# Patient Record
Sex: FEMALE | Race: WHITE | Employment: FULL TIME | URBAN - METROPOLITAN AREA
[De-identification: names, ages, dates, MRNs, and addresses within clinical notes are randomized per-mention and may not be internally consistent; named-entity substitution may affect disease eponyms.]

---

## 2017-03-20 PROBLEM — M54.2 CHRONIC NECK PAIN: Status: ACTIVE | Noted: 2017-03-20

## 2017-03-20 PROBLEM — G89.29 CHRONIC NECK PAIN: Status: ACTIVE | Noted: 2017-03-20

## 2018-03-22 PROCEDURE — 88175 CYTOPATH C/V AUTO FLUID REDO: CPT | Performed by: FAMILY MEDICINE

## 2018-03-22 PROCEDURE — 87624 HPV HI-RISK TYP POOLED RSLT: CPT | Performed by: FAMILY MEDICINE

## 2020-01-30 PROBLEM — E55.9 VITAMIN D DEFICIENCY: Status: ACTIVE | Noted: 2020-01-30

## 2021-05-04 NOTE — PROGRESS NOTES
HPI/Subjective:   Patient ID: Harriett Esqueda is a 63year old female. HPI   63yr old female presents as a new patient with c/o depression/anxiety, migraines, perimenopause and obesity.   C/o indifferent mood, irritability, anxiety, sleep disturbances Misc Natural Products (JOINT SUPPORT COMPLEX OR) Take by mouth.      • escitalopram (LEXAPRO) 10 MG Oral Tab Take 5mg po daily x 1wk and then 10mg po daily 30 tablet 1   • Rizatriptan Benzoate 10 MG Oral Tab Take 1 tablet (10 mg total) by mouth daily as nee encounter.       Meds This Visit:  Requested Prescriptions     Signed Prescriptions Disp Refills   • escitalopram (LEXAPRO) 10 MG Oral Tab 30 tablet 1     Sig: Take 5mg po daily x 1wk and then 10mg po daily       Imaging & Referrals:  None

## 2021-05-04 NOTE — PATIENT INSTRUCTIONS
Your Body’s Response to Anxiety  Normal anxiety is part of the body’s natural defense system.  It's an alert to a threat that is unknown, vague, or comes from your own internal fears. While you’re in this state, your feelings can range from a vague sense to anxiety problems. Anxiety can be treated  The good news is that the anxiety that’s disrupting your life can be treated. Check with your healthcare provider and rule out any physical problems that may be causing the anxiety symptoms.  If an anxiety diso depression, you can also help yourself. Keep in mind that your illness affects you emotionally, physically, mentally, and socially. So full recovery will take time. Take care of your body and your soul, and be patient with yourself as you get better.   Self when it’s offered. Take care of your body  People with depression often lose the desire to take care of themselves. That only makes their problems worse. During treatment and afterward, make a point to:  · Exercise.  It’s a great way to take care of your

## 2021-06-01 ENCOUNTER — TELEPHONE (OUTPATIENT)
Dept: FAMILY MEDICINE CLINIC | Facility: CLINIC | Age: 56
End: 2021-06-01

## 2021-06-01 DIAGNOSIS — Z12.31 BREAST CANCER SCREENING BY MAMMOGRAM: Primary | ICD-10-CM

## 2021-06-01 NOTE — TELEPHONE ENCOUNTER
Pt calling asking if doctor will place mammogram orders so she can have them done before her appointment. Please advise.     Future Appointments   Date Time Provider [place]   6/15/2021  4:40 PM Felicia Garduno,  [facility]

## 2021-06-01 NOTE — TELEPHONE ENCOUNTER
3/5/2020:  RIGHT BREAST DIAGNOSTIC MAMMOGRAM ADDITIONAL VIEWS  WITH TOMOSYNTHESIS  AND RIGHT BREAST ULTRASOUND    IMPRESSION AND RECOMMENDATION  Benign findings. No mammographic or sonographic evidence of malignancy. Right breast cysts.  In the  absence of

## 2021-06-05 ENCOUNTER — HOSPITAL ENCOUNTER (OUTPATIENT)
Dept: MAMMOGRAPHY | Age: 56
Discharge: HOME | End: 2021-06-05
Attending: FAMILY MEDICINE
Payer: COMMERCIAL

## 2021-06-05 DIAGNOSIS — Z12.31 BREAST CANCER SCREENING BY MAMMOGRAM: ICD-10-CM

## 2021-06-05 PROCEDURE — 77063 BREAST TOMOSYNTHESIS BI: CPT | Performed by: FAMILY MEDICINE

## 2021-06-05 PROCEDURE — 77067 SCR MAMMO BI INCL CAD: CPT | Performed by: FAMILY MEDICINE

## 2021-06-15 ENCOUNTER — OFFICE VISIT (OUTPATIENT)
Dept: FAMILY MEDICINE CLINIC | Facility: CLINIC | Age: 56
End: 2021-06-15
Payer: COMMERCIAL

## 2021-06-15 VITALS
SYSTOLIC BLOOD PRESSURE: 122 MMHG | BODY MASS INDEX: 31.43 KG/M2 | DIASTOLIC BLOOD PRESSURE: 82 MMHG | HEART RATE: 75 BPM | WEIGHT: 207.38 LBS | TEMPERATURE: 97 F | HEIGHT: 68.11 IN | RESPIRATION RATE: 16 BRPM | OXYGEN SATURATION: 98 %

## 2021-06-15 DIAGNOSIS — F41.8 DEPRESSION WITH ANXIETY: ICD-10-CM

## 2021-06-15 DIAGNOSIS — Z00.00 ROUTINE GENERAL MEDICAL EXAMINATION AT A HEALTH CARE FACILITY: Primary | ICD-10-CM

## 2021-06-15 DIAGNOSIS — Z23 NEED FOR VACCINATION: ICD-10-CM

## 2021-06-15 DIAGNOSIS — Z00.00 LABORATORY EXAM ORDERED AS PART OF ROUTINE GENERAL MEDICAL EXAMINATION: ICD-10-CM

## 2021-06-15 DIAGNOSIS — D22.9 CHANGE IN MOLE: ICD-10-CM

## 2021-06-15 DIAGNOSIS — E55.9 VITAMIN D DEFICIENCY: ICD-10-CM

## 2021-06-15 PROCEDURE — 99396 PREV VISIT EST AGE 40-64: CPT | Performed by: FAMILY MEDICINE

## 2021-06-15 PROCEDURE — 3074F SYST BP LT 130 MM HG: CPT | Performed by: FAMILY MEDICINE

## 2021-06-15 PROCEDURE — 3008F BODY MASS INDEX DOCD: CPT | Performed by: FAMILY MEDICINE

## 2021-06-15 PROCEDURE — 99213 OFFICE O/P EST LOW 20 MIN: CPT | Performed by: FAMILY MEDICINE

## 2021-06-15 PROCEDURE — 3079F DIAST BP 80-89 MM HG: CPT | Performed by: FAMILY MEDICINE

## 2021-06-15 NOTE — PATIENT INSTRUCTIONS
Prevention Guidelines, Women Ages 48 to 59  Screening tests and vaccines are an important part of managing your health. A screening test is done to find possible disorders or diseases in people who don't have any symptoms.  The goal is to find a disease e have had a complete hysterectomy  Pap test every 3 years or Pap test with human papillomavirus (HPV) test every 5 years    Chlamydia Women who are sexually active and at increased risk for infection  At yearly routine exams    Colorectal cancer All women a with your healthcare provider for more information.     Obesity All women in this age group  At yearly routine exams    Osteoporosis Women who are postmenopausal  Talk with your healthcare provider    Syphilis Women at increased risk for infection  At [facility] Tetanus/diphtheria/pertussis (Td/Tdap) booster All women in this age group  Td every 10 years, or a 1-time dose of Tdap instead of a Td booster after age 25, then Td every 10 years    Recombinant zoster vaccine (RZV)  All women ages 48 and older  If 2 dose watch the moles on your body and tell your healthcare provider about any that concern you. What are moles? Moles are a type of pigmented janelle.  Freckles are another type of pigmented janelle that are often sprinkled across the bridge of the nose, the cheeks, help you see if any moles change over time. When to get medical care  See your healthcare provider if your moles hurt, itch, ooze, bleed, thicken, become crusty, or show other changes.  Also call your health care provider if your moles show any of these si

## 2021-06-15 NOTE — PROGRESS NOTES
Patient presents with:  Physical      HPI:  63yr old female presents for her annual physical and f/u on depression w/ anxiety. Depression with anxiety, taking lexapro 10mg po daily. Denies any significant SEs of mediation.  Mood has improved, less irritab ?????     Social History    Tobacco Use      Smoking status: Never Smoker      Smokeless tobacco: Never Used    Vaping Use      Vaping Use: Never used    Alcohol use:  Yes      Alcohol/week: 0.0 standard drinks      Comment: social     Drug use: No      Cur Dexa Scan: n/a  5.  Immunizations:   Immunization History  Administered            Date(s) Administered    [employer] 30 mcg/0.3 ml                          04/06/2021 04/27/2021      TDAP                  11/16/2010 01/30/2020    Deferred needed    Orders Placed This Encounter      CBC With Diff      CMP      Lipid      TSH W Reflex To Free T4      Vitamin D, 25-Hydroxy      Meds & Refills for this Visit:  Requested Prescriptions      No prescriptions requested or ordered in this encounter

## 2021-06-26 ENCOUNTER — LAB ENCOUNTER (OUTPATIENT)
Dept: LAB | Age: 56
End: 2021-06-26
Attending: FAMILY MEDICINE
Payer: COMMERCIAL

## 2021-06-26 DIAGNOSIS — Z00.00 LABORATORY EXAM ORDERED AS PART OF ROUTINE GENERAL MEDICAL EXAMINATION: ICD-10-CM

## 2021-06-26 DIAGNOSIS — E55.9 VITAMIN D DEFICIENCY: ICD-10-CM

## 2021-06-26 PROCEDURE — 82306 VITAMIN D 25 HYDROXY: CPT | Performed by: FAMILY MEDICINE

## 2021-06-26 PROCEDURE — 80061 LIPID PANEL: CPT | Performed by: FAMILY MEDICINE

## 2021-06-26 PROCEDURE — 80050 GENERAL HEALTH PANEL: CPT | Performed by: FAMILY MEDICINE

## 2021-07-01 DIAGNOSIS — F41.8 DEPRESSION WITH ANXIETY: ICD-10-CM

## 2021-07-02 RX ORDER — ESCITALOPRAM OXALATE 10 MG/1
TABLET ORAL
Qty: 30 TABLET | Refills: 0 | Status: SHIPPED | OUTPATIENT
Start: 2021-07-02 | End: 2021-08-03

## 2021-07-02 NOTE — TELEPHONE ENCOUNTER
LOV 6/15/2021    LAST LAB    LAST RX 5-4--21 30*1    Next OV No future appointments.     PROTOCOL  Name from pharmacy: Escitalopram Oxalate 10 Mg Tab Solc          Will file in chart as: ESCITALOPRAM 10 MG Oral Tab    Sig: take one-half of a tablet by mouth

## 2021-07-15 ENCOUNTER — TELEMEDICINE (OUTPATIENT)
Dept: FAMILY MEDICINE CLINIC | Facility: CLINIC | Age: 56
End: 2021-07-15
Payer: COMMERCIAL

## 2021-07-15 DIAGNOSIS — F41.8 DEPRESSION WITH ANXIETY: ICD-10-CM

## 2021-07-15 DIAGNOSIS — E55.9 VITAMIN D INSUFFICIENCY: ICD-10-CM

## 2021-07-15 DIAGNOSIS — D64.9 ANEMIA, UNSPECIFIED TYPE: Primary | ICD-10-CM

## 2021-07-15 PROCEDURE — 99213 OFFICE O/P EST LOW 20 MIN: CPT | Performed by: FAMILY MEDICINE

## 2021-07-15 NOTE — PROGRESS NOTES
Audio visit  Please note that the following visit was completed using two-way, real-time interactive audio communication.   This has been done in good blaire to provide continuity of care in the best interest of the provider-patient relationship, due to the Pulmonary effort is normal.   Neurological:      Mental Status: She is oriented to person, place, and time. Psychiatric:         Mood and Affect: Mood normal.         Behavior: Behavior normal.         Assessment & Plan:   1.  Anemia, unspecified type  -

## 2021-07-28 ENCOUNTER — LAB ENCOUNTER (OUTPATIENT)
Dept: LAB | Age: 56
End: 2021-07-28
Attending: FAMILY MEDICINE
Payer: COMMERCIAL

## 2021-07-28 DIAGNOSIS — D64.9 ANEMIA, UNSPECIFIED TYPE: ICD-10-CM

## 2021-07-28 LAB
DEPRECATED HBV CORE AB SER IA-ACNC: 6.6 NG/ML
IRON SATURATION: 6 %
IRON SERPL-MCNC: 27 UG/DL
TOTAL IRON BINDING CAPACITY: 459 UG/DL (ref 240–450)
TRANSFERRIN SERPL-MCNC: 308 MG/DL (ref 200–360)
VIT B12 SERPL-MCNC: 392 PG/ML (ref 193–986)

## 2021-07-28 PROCEDURE — 83550 IRON BINDING TEST: CPT | Performed by: FAMILY MEDICINE

## 2021-07-28 PROCEDURE — 83540 ASSAY OF IRON: CPT | Performed by: FAMILY MEDICINE

## 2021-07-28 PROCEDURE — 82607 VITAMIN B-12: CPT | Performed by: FAMILY MEDICINE

## 2021-07-28 PROCEDURE — 82728 ASSAY OF FERRITIN: CPT | Performed by: FAMILY MEDICINE

## 2021-08-01 DIAGNOSIS — F41.8 DEPRESSION WITH ANXIETY: ICD-10-CM

## 2021-08-03 RX ORDER — ESCITALOPRAM OXALATE 10 MG/1
10 TABLET ORAL DAILY
Qty: 30 TABLET | Refills: 1 | Status: SHIPPED | OUTPATIENT
Start: 2021-08-03 | End: 2021-08-16

## 2021-08-16 ENCOUNTER — TELEPHONE (OUTPATIENT)
Dept: FAMILY MEDICINE CLINIC | Facility: CLINIC | Age: 56
End: 2021-08-16

## 2021-08-16 DIAGNOSIS — F41.8 DEPRESSION WITH ANXIETY: ICD-10-CM

## 2021-08-16 RX ORDER — ESCITALOPRAM OXALATE 10 MG/1
10 TABLET ORAL DAILY
Qty: 90 TABLET | Refills: 0 | Status: SHIPPED | OUTPATIENT
Start: 2021-08-16 | End: 2022-01-27

## 2021-08-16 NOTE — TELEPHONE ENCOUNTER
Per tele visit notes 7/15/21: Depression with anxiety  - stable  - cpm  She understands and agrees with tx plan  RTC in 3mo, sooner if needed    Last refill   escitalopram 10 MG Oral Tab 30 tablet 1 8/3/2021    Sig:   Take 1 tablet (10 mg total) by mouth d

## 2021-08-16 NOTE — TELEPHONE ENCOUNTER
Patient asking how to request 90 day supply of Lexapro. If Doctor would authorize 90 days at a time.     Please call patient

## 2021-08-16 NOTE — TELEPHONE ENCOUNTER
Called pt to inform per PCP ok for 90 day supply of Lexapro- refilled to [address] listed as requested. Reminder to still f/u in 3 months (10/2021). Nothing further needed at this time. No further questions or concerns.  Pt verbalized understanding and

## 2022-01-24 ENCOUNTER — TELEPHONE (OUTPATIENT)
Dept: FAMILY MEDICINE CLINIC | Facility: CLINIC | Age: 57
End: 2022-01-24

## 2022-01-24 NOTE — TELEPHONE ENCOUNTER
Pt requesting refill on escitalopram 10 MG Oral Tab       Sent pt [employer])hart message to schedule f/u appt that she is overdue for.

## 2022-01-26 DIAGNOSIS — F41.8 DEPRESSION WITH ANXIETY: ICD-10-CM

## 2022-01-27 RX ORDER — ESCITALOPRAM OXALATE 10 MG/1
TABLET ORAL
Qty: 90 TABLET | Refills: 0 | Status: SHIPPED | OUTPATIENT
Start: 2022-01-27

## 2022-01-27 NOTE — TELEPHONE ENCOUNTER
LOV 6/15/2021    LAST LAB    LAST RX 8-16-21 90*0    Next OV No future appointments.     PROTOCOL  Name from pharmacy: Escitalopram Oxalate 10 Mg Tab Auro          Will file in chart as: ESCITALOPRAM 10 MG Oral Tab    Sig: TAKE ONE TABLET BY MOUTH ONE TIME

## 2022-06-10 ENCOUNTER — TELEPHONE (OUTPATIENT)
Dept: FAMILY MEDICINE CLINIC | Facility: CLINIC | Age: 57
End: 2022-06-10

## 2022-06-10 DIAGNOSIS — Z12.31 ENCOUNTER FOR SCREENING MAMMOGRAM FOR MALIGNANT NEOPLASM OF BREAST: Primary | ICD-10-CM

## 2022-06-24 ENCOUNTER — HOSPITAL ENCOUNTER (OUTPATIENT)
Dept: MAMMOGRAPHY | Age: 57
Discharge: HOME | End: 2022-06-24
Attending: FAMILY MEDICINE
Payer: COMMERCIAL

## 2022-06-24 DIAGNOSIS — Z12.31 ENCOUNTER FOR SCREENING MAMMOGRAM FOR MALIGNANT NEOPLASM OF BREAST: ICD-10-CM

## 2022-06-24 PROCEDURE — 77067 SCR MAMMO BI INCL CAD: CPT | Performed by: FAMILY MEDICINE

## 2022-06-24 PROCEDURE — 77063 BREAST TOMOSYNTHESIS BI: CPT | Performed by: FAMILY MEDICINE

## 2022-09-20 ENCOUNTER — MED REC SCAN ONLY (OUTPATIENT)
Dept: FAMILY MEDICINE CLINIC | Facility: CLINIC | Age: 57
End: 2022-09-20

## 2022-12-18 ENCOUNTER — HOSPITAL ENCOUNTER (OUTPATIENT)
Age: 57
Discharge: HOME | End: 2022-12-18
Payer: COMMERCIAL

## 2022-12-18 VITALS
BODY MASS INDEX: 30.31 KG/M2 | OXYGEN SATURATION: 98 % | HEART RATE: 101 BPM | SYSTOLIC BLOOD PRESSURE: 138 MMHG | TEMPERATURE: 99 F | WEIGHT: 200 LBS | HEIGHT: 68 IN | DIASTOLIC BLOOD PRESSURE: 73 MMHG | RESPIRATION RATE: 20 BRPM

## 2022-12-18 DIAGNOSIS — H10.32 ACUTE CONJUNCTIVITIS OF LEFT EYE, UNSPECIFIED ACUTE CONJUNCTIVITIS TYPE: Primary | ICD-10-CM

## 2022-12-18 DIAGNOSIS — J01.40 ACUTE NON-RECURRENT PANSINUSITIS: ICD-10-CM

## 2022-12-18 DIAGNOSIS — J03.90 TONSILLITIS WITH EXUDATE: ICD-10-CM

## 2022-12-18 LAB
POCT INFLUENZA A: NEGATIVE
POCT INFLUENZA B: NEGATIVE
S PYO AG THROAT QL: NEGATIVE
SARS-COV-2 RNA RESP QL NAA+PROBE: NOT DETECTED

## 2022-12-18 PROCEDURE — 87502 INFLUENZA DNA AMP PROBE: CPT | Performed by: PHYSICIAN ASSISTANT

## 2022-12-18 PROCEDURE — 99213 OFFICE O/P EST LOW 20 MIN: CPT

## 2022-12-18 PROCEDURE — 99204 OFFICE O/P NEW MOD 45 MIN: CPT

## 2022-12-18 PROCEDURE — 87880 STREP A ASSAY W/OPTIC: CPT

## 2022-12-18 RX ORDER — POLYMYXIN B SULFATE AND TRIMETHOPRIM 1; 10000 MG/ML; [USP'U]/ML
1 SOLUTION OPHTHALMIC
Qty: 10 ML | Refills: 0 | Status: SHIPPED | OUTPATIENT
Start: 2022-12-18 | End: 2022-12-23

## 2022-12-18 RX ORDER — FLUTICASONE PROPIONATE 50 MCG
2 SPRAY, SUSPENSION (ML) NASAL DAILY
Qty: 16 G | Refills: 0 | Status: SHIPPED | OUTPATIENT
Start: 2022-12-18 | End: 2023-01-17

## 2022-12-18 RX ORDER — AMOXICILLIN AND CLAVULANATE POTASSIUM 875; 125 MG/1; MG/1
1 TABLET, FILM COATED ORAL 2 TIMES DAILY
Qty: 14 TABLET | Refills: 0 | Status: SHIPPED | OUTPATIENT
Start: 2022-12-18 | End: 2022-12-25

## 2022-12-18 NOTE — DISCHARGE INSTRUCTIONS
Rest    Frequent hand washing    See ophthalmologist if worsening or no improvement in 48-72 hours    Start antibiotics eye drops right away  Warm moist compresses to the eye to remove pustular drainage    Close follow-up with ophthalmology in the next 3-5 days if not better or sooner if worsening. While taking antibiotics it is recommended that you also take an over the counter probiotics. If you'd like, you can have 2 servings of yogurt/Kefir daily instead. Probiotics increase the good bacteria in your gut while the antibiotic fights the bad bacteria that is causing your infection. The good bacteria in your gut act as part of your immune system. Taking a probiotic while on antibiotics will decrease the chances of upset stomach and diarrhea, which are common side effects of antibiotics.

## 2022-12-18 NOTE — ED INITIAL ASSESSMENT (HPI)
Pt had 1 week of uri symptoms, cough sore throat congestion, and now her eyes are red, and she has body aches

## 2023-01-05 ENCOUNTER — OFFICE VISIT (OUTPATIENT)
Dept: FAMILY MEDICINE CLINIC | Facility: CLINIC | Age: 58
End: 2023-01-05
Payer: COMMERCIAL

## 2023-01-05 VITALS
HEIGHT: 68 IN | DIASTOLIC BLOOD PRESSURE: 74 MMHG | OXYGEN SATURATION: 97 % | RESPIRATION RATE: 16 BRPM | WEIGHT: 212.25 LBS | BODY MASS INDEX: 32.17 KG/M2 | HEART RATE: 68 BPM | TEMPERATURE: 98 F | SYSTOLIC BLOOD PRESSURE: 120 MMHG

## 2023-01-05 DIAGNOSIS — Z12.4 ENCOUNTER FOR SCREENING FOR CERVICAL CANCER: ICD-10-CM

## 2023-01-05 DIAGNOSIS — D50.8 IRON DEFICIENCY ANEMIA SECONDARY TO INADEQUATE DIETARY IRON INTAKE: ICD-10-CM

## 2023-01-05 DIAGNOSIS — E55.9 VITAMIN D DEFICIENCY: ICD-10-CM

## 2023-01-05 DIAGNOSIS — E66.9 OBESITY (BMI 30-39.9): ICD-10-CM

## 2023-01-05 DIAGNOSIS — Z86.39 HISTORY OF NON ANEMIC VITAMIN B12 DEFICIENCY: ICD-10-CM

## 2023-01-05 DIAGNOSIS — Z00.01 ENCOUNTER FOR GENERAL ADULT MEDICAL EXAMINATION WITH ABNORMAL FINDINGS: Primary | ICD-10-CM

## 2023-01-05 PROCEDURE — 87624 HPV HI-RISK TYP POOLED RSLT: CPT | Performed by: FAMILY MEDICINE

## 2023-01-05 RX ORDER — PNV NO.95/FERROUS FUM/FOLIC AC 28MG-0.8MG
TABLET ORAL
Qty: 30 TABLET | Refills: 0 | Status: CANCELLED | OUTPATIENT
Start: 2023-01-05 | End: 2023-02-04

## 2023-01-05 RX ORDER — FERRIC MALTOL 30 MG/1
30 CAPSULE ORAL
Qty: 30 CAPSULE | Refills: 3 | Status: SHIPPED | OUTPATIENT
Start: 2023-01-05

## 2023-01-06 ENCOUNTER — TELEPHONE (OUTPATIENT)
Dept: FAMILY MEDICINE CLINIC | Facility: CLINIC | Age: 58
End: 2023-01-06

## 2023-01-06 LAB — HPV I/H RISK 1 DNA SPEC QL NAA+PROBE: NEGATIVE

## 2023-01-06 NOTE — TELEPHONE ENCOUNTER
Accrufer 30 mg caps slow release is not covered by patients insurance . Pharmacy is looking for alternative . Please advice .

## 2023-01-13 LAB
% SATURATION: 25 % (CALC) (ref 16–45)
ABSOLUTE BASOPHILS: 66 CELLS/UL (ref 0–200)
ABSOLUTE EOSINOPHILS: 191 CELLS/UL (ref 15–500)
ABSOLUTE LYMPHOCYTES: 1768 CELLS/UL (ref 850–3900)
ABSOLUTE MONOCYTES: 457 CELLS/UL (ref 200–950)
ABSOLUTE NEUTROPHILS: 5818 CELLS/UL (ref 1500–7800)
ALBUMIN/GLOBULIN RATIO: 1.3 (CALC) (ref 1–2.5)
ALBUMIN: 4.3 G/DL (ref 3.6–5.1)
ALKALINE PHOSPHATASE: 128 U/L (ref 37–153)
ALT: 17 U/L (ref 6–29)
AST: 18 U/L (ref 10–35)
BASOPHILS: 0.8 %
BILIRUBIN, TOTAL: 0.4 MG/DL (ref 0.2–1.2)
BUN: 12 MG/DL (ref 7–25)
CALCIUM: 9.5 MG/DL (ref 8.6–10.4)
CARBON DIOXIDE: 27 MMOL/L (ref 20–32)
CHLORIDE: 105 MMOL/L (ref 98–110)
CHOL/HDLC RATIO: 3 (CALC)
CHOLESTEROL, TOTAL: 190 MG/DL
CREATININE: 0.8 MG/DL (ref 0.5–1.03)
EGFR: 85 ML/MIN/1.73M2
EOSINOPHILS: 2.3 %
FERRITIN: 36 NG/ML (ref 16–232)
GLOBULIN: 3.3 G/DL (CALC) (ref 1.9–3.7)
GLUCOSE: 98 MG/DL (ref 65–99)
HDL CHOLESTEROL: 64 MG/DL
HEMATOCRIT: 44.6 % (ref 35–45)
HEMOGLOBIN: 14.6 G/DL (ref 11.7–15.5)
IRON BINDING CAPACITY: 326 MCG/DL (CALC) (ref 250–450)
IRON, TOTAL: 82 MCG/DL (ref 45–160)
LDL-CHOLESTEROL: 105 MG/DL (CALC)
LYMPHOCYTES: 21.3 %
MCH: 27.7 PG (ref 27–33)
MCHC: 32.7 G/DL (ref 32–36)
MCV: 84.5 FL (ref 80–100)
MONOCYTES: 5.5 %
MPV: 10.2 FL (ref 7.5–12.5)
NEUTROPHILS: 70.1 %
NON-HDL CHOLESTEROL: 126 MG/DL (CALC)
PLATELET COUNT: 360 THOUSAND/UL (ref 140–400)
POTASSIUM: 4.1 MMOL/L (ref 3.5–5.3)
PROTEIN, TOTAL: 7.6 G/DL (ref 6.1–8.1)
RDW: 12.9 % (ref 11–15)
RED BLOOD CELL COUNT: 5.28 MILLION/UL (ref 3.8–5.1)
SODIUM: 139 MMOL/L (ref 135–146)
TRIGLYCERIDES: 111 MG/DL
TSH W/REFLEX TO FT4: 1.46 MIU/L (ref 0.4–4.5)
VITAMIN B12: 579 PG/ML (ref 200–1100)
VITAMIN D, 25-OH, TOTAL: 34 NG/ML (ref 30–100)
WHITE BLOOD CELL COUNT: 8.3 THOUSAND/UL (ref 3.8–10.8)

## 2023-03-01 ENCOUNTER — TELEPHONE (OUTPATIENT)
Dept: FAMILY MEDICINE CLINIC | Facility: CLINIC | Age: 58
End: 2023-03-01

## 2023-03-02 ENCOUNTER — OFFICE VISIT (OUTPATIENT)
Dept: FAMILY MEDICINE CLINIC | Facility: CLINIC | Age: 58
End: 2023-03-02
Payer: COMMERCIAL

## 2023-03-02 VITALS
WEIGHT: 213.25 LBS | BODY MASS INDEX: 32.32 KG/M2 | OXYGEN SATURATION: 97 % | SYSTOLIC BLOOD PRESSURE: 120 MMHG | TEMPERATURE: 97 F | DIASTOLIC BLOOD PRESSURE: 70 MMHG | RESPIRATION RATE: 16 BRPM | HEIGHT: 68 IN | HEART RATE: 97 BPM

## 2023-03-02 DIAGNOSIS — M54.9 MID BACK PAIN ON LEFT SIDE: Primary | ICD-10-CM

## 2023-03-02 PROCEDURE — 3008F BODY MASS INDEX DOCD: CPT | Performed by: FAMILY MEDICINE

## 2023-03-02 PROCEDURE — 99214 OFFICE O/P EST MOD 30 MIN: CPT | Performed by: FAMILY MEDICINE

## 2023-03-02 PROCEDURE — 3078F DIAST BP <80 MM HG: CPT | Performed by: FAMILY MEDICINE

## 2023-03-02 PROCEDURE — 3074F SYST BP LT 130 MM HG: CPT | Performed by: FAMILY MEDICINE

## 2023-03-02 RX ORDER — MELATONIN
325
COMMUNITY

## 2023-03-02 RX ORDER — TIZANIDINE 2 MG/1
2 TABLET ORAL EVERY 6 HOURS PRN
Qty: 15 TABLET | Refills: 0 | Status: SHIPPED | OUTPATIENT
Start: 2023-03-02

## 2023-07-14 ENCOUNTER — TELEPHONE (OUTPATIENT)
Dept: FAMILY MEDICINE CLINIC | Facility: CLINIC | Age: 58
End: 2023-07-14

## 2023-07-14 DIAGNOSIS — Z12.31 ENCOUNTER FOR SCREENING MAMMOGRAM FOR MALIGNANT NEOPLASM OF BREAST: Primary | ICD-10-CM

## 2023-07-14 NOTE — TELEPHONE ENCOUNTER
Last Mammogram 6/24/22  Impression   CONCLUSION:  Stable mammogram       BI-RADS CATEGORY:    DIAGNOSTIC CATEGORY 2--BENIGN FINDING NO CHANGE FROM COMPARISON ASSESSMENT. RECOMMENDATIONS:    ROUTINE MAMMOGRAM AND CLINICAL EVALUATION IN 12 MONTHS. LOV 3/2/23     Routine screening mammogram order placed per protocol. Order for screening mammogram with additional views and US if needed faxed to [facility] at [phone]. Confirmation fax received.

## 2023-07-14 NOTE — TELEPHONE ENCOUNTER
Pt requesting mammo orders. Pt will be going to [place] [facility]. Den she need US order too?  Please advise

## 2023-07-29 ENCOUNTER — PATIENT MESSAGE (OUTPATIENT)
Dept: FAMILY MEDICINE CLINIC | Facility: CLINIC | Age: 58
End: 2023-07-29

## 2023-07-29 DIAGNOSIS — R92.2 DENSE BREAST TISSUE: Primary | ICD-10-CM

## 2023-07-31 NOTE — TELEPHONE ENCOUNTER
Dr. Jessenia Lynn,  please advise regarding \"the additional test that is needed for dense tissue which the imaging center said they don't automatically do anymore unless the doctor requests it. \". Last Mammogram 6/24/22    Narrative   PROCEDURE:  [facility] GABBY 2D+3D SCREENING BILAT (CPT=77067/03053)        BREAST COMPOSITION:   Heterogeneously dense, which may obscure small masses. FINDINGS: Stable parenchymal pattern both breasts. No suspicious calcifications, spiculated masses or areas of architectural distortion in either breast.          Impression   CONCLUSION:  Stable mammogram       BI-RADS CATEGORY:    DIAGNOSTIC CATEGORY 2--BENIGN FINDING NO CHANGE FROM COMPARISON ASSESSMENT. RECOMMENDATIONS:    ROUTINE MAMMOGRAM AND CLINICAL EVALUATION IN 12 MONTHS. Because of breast density this patient may benefit from supplemental screening with Molecular Breast Imaging (MBI) or bilateral screening breast ultrasound for increased sensitivity for detection of cancer which can be obscured mammographically. Please  contact your ordering provider to discuss supplemental screening options.

## 2023-07-31 NOTE — TELEPHONE ENCOUNTER
From: Jerie Landau  To: Lincoln Noel DO  Sent: 7/29/2023 11:45 AM CDT  Subject: Mammogram - past info needed    Hi ladies. Please send over to [address] my past test results from my last mammogram and also ask if Dr Angie Rodriguez can make the request for the additional test that is needed for dense tissue which the imaging center said they don't automatically do anymore unless the doctor requests it. My appt with them is on 8/24. Thanks much!

## 2023-08-03 NOTE — TELEPHONE ENCOUNTER
Order placed and faxed to [facility] at [phone]. Confirmation fax received.

## 2024-06-13 ENCOUNTER — OFFICE VISIT (OUTPATIENT)
Dept: FAMILY MEDICINE CLINIC | Facility: CLINIC | Age: 59
End: 2024-06-13
Payer: COMMERCIAL

## 2024-06-13 VITALS
RESPIRATION RATE: 16 BRPM | OXYGEN SATURATION: 98 % | TEMPERATURE: 98 F | BODY MASS INDEX: 31.37 KG/M2 | DIASTOLIC BLOOD PRESSURE: 70 MMHG | WEIGHT: 207 LBS | SYSTOLIC BLOOD PRESSURE: 100 MMHG | HEART RATE: 78 BPM | HEIGHT: 68 IN

## 2024-06-13 DIAGNOSIS — M25.561 CHRONIC PAIN OF RIGHT KNEE: ICD-10-CM

## 2024-06-13 DIAGNOSIS — G89.29 CHRONIC PAIN OF RIGHT KNEE: ICD-10-CM

## 2024-06-13 DIAGNOSIS — M77.01 MEDIAL EPICONDYLITIS OF ELBOW, RIGHT: ICD-10-CM

## 2024-06-13 PROCEDURE — 99214 OFFICE O/P EST MOD 30 MIN: CPT | Performed by: FAMILY MEDICINE

## 2024-06-13 NOTE — PROGRESS NOTES
Family Medicine Progress Note  Assessment & Plan:   Amanda Khan is a 58 year old female who is here for:   1. Chronic pain of right knee- chronic, worsening and persistent.  Suspect OA.   - Check XR  - PT for muscular strengthening.   - Pneing XR will determine subspecialtist.   - XR KNEE (3 VIEWS), RIGHT (CPT=73562); Future  - OP REFERRAL TO [facility]    2. Medial epicondylitis of elbow, right- history and exam consistent with epicondylitis.    - Discussed the usual course of this condition   - Discussed minimizing repetitive motion if possible   - Ice after exacerbating activity  - Discussed Counterforce Strap. - Strap provided.   - Briefly discussed PT and Exercises to try at home- Sports Med Advisor H/O given    - Schedule NSAIDs x 1-2 wk to reduce inflammation.    - OP REFERRAL TO [facility]     Follow-Up: Return for Annual.      Nkechi Birmingham DO   6/13/24     CC: Musculoskeletal Problem (Rt knee pain and Rt elbow pain. )    Subjective:    History of Present Illness:  History obtained from patient.     Amanda Khan is a 58 year old female who presents for Musculoskeletal Problem (Rt knee pain and Rt elbow pain. )     RT KNEE PAIN-  chronic issue and has been getting worse over the past 9mos, now noticing it daily.  Severity fluctuates through the day. Stiffness in AM. Golf/Bowling--- having to take meds before.   Did have episode where she had giving way after hyperextension.   XR  No Prior PT.     RT ELBOW- inisdious and progressive--Thinks its tightness from overuse. Typing, bowling, golfing.   Hasn't done counter force strap.  Biofreeze and icyhot.     Pmhx: Migraines, Anemia, [place] Palsy, Anxiety  Pshx: CSPY, Facial N Surg, Lasik  All: NKDA  Meds: as below  Fam hx: Dad: Parkinson's, COPD, asthma, glaucoma, mom: PSP (progressive supranuclear palsy), both sides with depression  Soc hx: no tob/occ etoh/no illicits; , lives with spouse, works I   logistics  Ob/gyne: No LMP recorded. (Menstrual status: Menopause).. last pap: 2018, last mammogram: 2022,  , Children- 0  Colonoscopy:         History/Other:   ROS-Per HPI     Problem List:  Patient Active Problem List   Diagnosis    Migraine    Chronic neck pain    Vitamin D deficiency       Current Medications:  Current Outpatient Medications   Medication Sig Dispense Refill    Cholecalciferol 125 MCG (5000 UT) Oral Tab Take 2 tablets (10,000 Units total) by mouth daily. 2-        Past Medical History:  Past Medical History:    Allergic rhinitis    Anxiety    Depression    Hospitalism    none      Past Surgical History:  Past Surgical History:   Procedure Laterality Date    Colonoscopy N/A 3/13/2017    Procedure: COLONOSCOPY, POSSIBLE BIOPSY, POSSIBLE POLYPECTOMY 87667;  Surgeon: Deangelo Mcclendon MD;  [facility]: [facility]      lasik eye surgery    Other surgical history      bells palsy, ear surgery      Family History:  Family History   Problem Relation Age of Onset    Hypertension Mother     Other (Other) Mother         PSP (similiar to ALS)    Depression Mother     Asthma Father     Heart Disorder Father         Pacer    Stroke Maternal Grandmother     Stroke Paternal Grandmother     Breast Cancer Cousin         maternal cousin dx age ?????      Social History:  Social History     Socioeconomic History    Marital status:     Number of children: 0   Occupational History    Occupation:    Tobacco Use    Smoking status: Never    Smokeless tobacco: Never   Vaping Use    Vaping status: Never Used   Substance and Sexual Activity    Alcohol use: Yes     Alcohol/week: 3.0 standard drinks of alcohol     Types: 3 Standard drinks or equivalent per week     Comment: social     Drug use: No    Sexual activity: Yes     Partners: Male   Other Topics Concern    Caffeine Concern No    Exercise No    Seat Belt Yes    Special Diet No    Stress Concern No    Weight Concern Yes      Comment: Menopause weight gain       Allergies:  No Known Allergies     Objective:    VITALS: /70   Pulse 78   Temp 97.9 °F (36.6 °C) (Temporal)   Resp 16   Ht 5' 8\" (1.727 m)   Wt 207 lb (93.9 kg)   SpO2 98%   BMI 31.47 kg/m²      BP Readings from Last 3 Encounters:   06/13/24 100/70   03/02/23 120/70   01/05/23 120/74     Wt Readings from Last 3 Encounters:   06/13/24 207 lb (93.9 kg)   03/02/23 213 lb 4 oz (96.7 kg)   01/05/23 212 lb 4 oz (96.3 kg)         PHYSICAL EXAM  GEN: pleasant, well-appearing in NAD  SKIN: no visible rashes, lesions, or evidence of trauma  HEENT:  no conjunctivitis or scleral icterus; Mask in place.  PULM: breathing comfortably on room air  NEURO: CNs grossly intact, no focal weakness, alert and oriented    RIGHT KNEE EXAM:  Inspection: Bony enlargement   Effusion: None   Palpation: Tender diffuse, but also along the pes anserine bursa.   ROM: Crepitus with ROM testing;    Muscle Strength:  5/5 knee flexion, 5/5 knee extension  Ligaments:  - ACL: In Tact (Lachman's: negative)  - PCL:  In tact ( Posterior Drawer: negative)  - MCL/LCL: In tact (Varus/Valgus testing: negative)  Meniscus: - Jeannine's: Negative     RIGHT ELBOW: Full Range-of-motion.  Strength 5/5 extension, flexion, pronation, supination, wrist extension & flexion - --- Pain with resisted pronation. . Non-tender over medial & lateral epicondyle. Ligaments - stable. Neurovascularly - intact.        Approximately 32 minutes was spent preparing to see the patient,  personally obtaining a history, conducting a physical exam, counseling the patient on the plan of care, entering appropriate orders, and documenting clinical information in the electronic health record.      Nkechi Birmingham DO

## 2024-06-15 ENCOUNTER — HOSPITAL ENCOUNTER (OUTPATIENT)
Dept: GENERAL RADIOLOGY | Age: 59
Discharge: HOME | End: 2024-06-15
Attending: FAMILY MEDICINE

## 2024-06-15 DIAGNOSIS — M25.561 CHRONIC PAIN OF RIGHT KNEE: ICD-10-CM

## 2024-06-15 DIAGNOSIS — G89.29 CHRONIC PAIN OF RIGHT KNEE: ICD-10-CM

## 2024-06-15 PROCEDURE — 73562 X-RAY EXAM OF KNEE 3: CPT | Performed by: FAMILY MEDICINE

## 2024-09-10 ENCOUNTER — OFFICE VISIT (OUTPATIENT)
Dept: FAMILY MEDICINE CLINIC | Facility: CLINIC | Age: 59
End: 2024-09-10
Payer: COMMERCIAL

## 2024-09-10 VITALS
DIASTOLIC BLOOD PRESSURE: 70 MMHG | RESPIRATION RATE: 16 BRPM | HEART RATE: 90 BPM | TEMPERATURE: 98 F | OXYGEN SATURATION: 97 % | BODY MASS INDEX: 31.37 KG/M2 | SYSTOLIC BLOOD PRESSURE: 102 MMHG | HEIGHT: 68 IN | WEIGHT: 207 LBS

## 2024-09-10 DIAGNOSIS — M77.01 MEDIAL EPICONDYLITIS OF ELBOW, RIGHT: Primary | ICD-10-CM

## 2024-09-10 DIAGNOSIS — Z12.31 ENCOUNTER FOR SCREENING MAMMOGRAM FOR MALIGNANT NEOPLASM OF BREAST: ICD-10-CM

## 2024-09-10 DIAGNOSIS — M65.4 TENOSYNOVITIS, DE QUERVAIN: ICD-10-CM

## 2024-09-10 PROCEDURE — 99213 OFFICE O/P EST LOW 20 MIN: CPT | Performed by: FAMILY MEDICINE

## 2024-09-10 RX ORDER — FERROUS SULFATE 325(65) MG
325 TABLET, DELAYED RELEASE (ENTERIC COATED) ORAL
COMMUNITY

## 2024-09-10 NOTE — PROGRESS NOTES
Family Medicine Progress Note  Assessment & Plan:   Amanda Khan is a 58 year old female who is here for:   1. Medial epicondylitis of elbow, right-  refractory to counterforsce strap and having difficult time resting given her line of work.  Also tends to get stressed with golfing and bowling.  No physical therapy yet.  - Discussed OT  - Discussed potential benfit for Sport Med, alos with tenderness over the cubital tinnel   - Occupational Therapy Referral - [facility]  - ORTHOPEDIC - INTERNAL    2. Tenosynovitis, de Quervain- H&P indicative of De Quervain's Tenosynovitis based on + Finkelstein's, pain over the Radial Styloid, and mild swelling over the radial styloid.  Pain with repetitive motions.  Doubt CMC arthritis or Scaphoid injury.    - Discussed nature of the condition.    - Occupational Therapy   - Topical Voltaren gel can offer relief  - sport Med referral   - Occupational Therapy Referral - [facility]  - ORTHOPEDIC - INTERNAL    3. Encounter for screening mammogram for malignant neoplasm of breast  - [facility] GABBY 2D+3D SCREENING BILAT (CPT=77067/59564); Future     Follow-Up: Return for Annual at some point .      Nkechi Birmingham DO        CC: Musculoskeletal Problem (Increasing Rt arm pain . )    Subjective:    History of Present Illness:  History obtained from patient.     Amanda Khan is a 58 year old female who presents for Musculoskeletal Problem (Increasing Rt arm pain . )       RT ELBOW- inisdious and progressive--Thinks its tightness from overuse. Typing, bowling, golfing.   Hasn't done counter force strap.  Biofreeze and icyhot.   09/10/24- Now in the biceps with radiation into the forearm and in dorsal hand. Just bought erognomic mouse. Hurst at rest, even with holding in dependant poistion.  Hard to lift.  No tingling;  feels like         Pmhx: Migraines, Anemia, [place] Palsy, Anxiety  Pshx: CSPY, Facial N Surg, Lasik  All: NKDA  Meds: as below  Fam hx: Dad: Parkinson's, COPD,  asthma, glaucoma, mom: PSP (progressive supranuclear palsy), both sides with depression  Soc hx: no tob/occ etoh/no illicits; , lives with spouse, works I  logistics  Ob/gyne: No LMP recorded. (Menstrual status: Menopause).. last pap: , last mammogram: 2022,  , Children- 0  Colonoscopy:         History/Other:   ROS-Per HPI     Problem List:  Patient Active Problem List   Diagnosis    Migraine    Chronic neck pain    Vitamin D deficiency       Current Medications:  Current Outpatient Medications   Medication Sig Dispense Refill    ferrous sulfate 325 (65 FE) MG Oral Tab EC Take 1 tablet (325 mg total) by mouth daily with breakfast.      Cholecalciferol 125 MCG (5000 UT) Oral Tab Take 2 tablets (10,000 Units total) by mouth daily. 2-        Past Medical History:  Past Medical History:    Allergic rhinitis    Anxiety    Depression    Hospitalism    none      Past Surgical History:  Past Surgical History:   Procedure Laterality Date    Colonoscopy N/A 3/13/2017    Procedure: COLONOSCOPY, POSSIBLE BIOPSY, POSSIBLE POLYPECTOMY 13389;  Surgeon: Deangelo Mcclendon MD;  Location: [facility]    Other      lasik eye surgery    Other surgical history      bells palsy, ear surgery      Family History:  Family History   Problem Relation Age of Onset    Hypertension Mother     Other (Other) Mother         PSP (similiar to ALS)    Depression Mother     Asthma Father     Heart Disorder Father         Pacer    Stroke Maternal Grandmother     Stroke Paternal Grandmother     Breast Cancer Cousin         maternal cousin dx age ?????      Social History:  Social History     Socioeconomic History    Marital status:     Number of children: 0   Occupational History    Occupation:    Tobacco Use    Smoking status: Never    Smokeless tobacco: Never   Vaping Use    Vaping status: Never Used   Substance and Sexual Activity    Alcohol use: Yes     Alcohol/week: 3.0 standard drinks of alcohol      Types: 3 Standard drinks or equivalent per week     Comment: social     Drug use: No    Sexual activity: Yes     Partners: Male   Other Topics Concern    Caffeine Concern No    Exercise No    Seat Belt Yes    Special Diet No    Stress Concern No    Weight Concern Yes     Comment: Menopause weight gain       Allergies:  No Known Allergies     Objective:    VITALS: /70   Pulse 90   Temp 97.8 °F (36.6 °C) (Temporal)   Resp 16   Ht 5' 8\" (1.727 m)   Wt 207 lb (93.9 kg)   SpO2 97%   BMI 31.47 kg/m²      BP Readings from Last 3 Encounters:   09/10/24 102/70   06/13/24 100/70   03/02/23 120/70     Wt Readings from Last 3 Encounters:   09/10/24 207 lb (93.9 kg)   06/13/24 207 lb (93.9 kg)   03/02/23 213 lb 4 oz (96.7 kg)         PHYSICAL EXAM  GEN: pleasant, well-appearing in NAD  SKIN: no visible rashes, lesions, or evidence of trauma  HEENT:  no conjunctivitis or scleral icterus; Mask in place.  PULM: breathing comfortably on room air  NEURO: CNs grossly intact, no focal weakness, alert and oriented    RIGHT ELBOW: Full Range-of-motion.  Strength 5/5 extension, flexion, pronation, supination, wrist extension & flexion - --- Pain with resisted pronation. . Non-tender over medial & lateral epicondyle. Ligaments - stable. Neurovascularly - intact.   Tinels positive at the Cubital Tunnel     RIGHT WRIST:  Full Range-of-motion; Strength 5/5 to wrist extension/flexion/ulnar dev/radial dev/forearm pronation & supination. Pain with resisted wrist extension.    +Finklesteins,   Phalens and CMC grind Negative       Nkechi Birmingham DO  09/10/24

## 2024-09-17 ENCOUNTER — PATIENT MESSAGE (OUTPATIENT)
Dept: FAMILY MEDICINE CLINIC | Facility: CLINIC | Age: 59
End: 2024-09-17

## 2024-09-26 ENCOUNTER — APPOINTMENT (OUTPATIENT)
Dept: OCCUPATIONAL MEDICINE | Age: 59
End: 2024-09-26
Attending: FAMILY MEDICINE
Payer: COMMERCIAL

## 2024-09-26 ENCOUNTER — TELEPHONE (OUTPATIENT)
Dept: PHYSICAL THERAPY | Facility: HOSPITAL | Age: 59
End: 2024-09-26

## 2024-09-27 ENCOUNTER — OFFICE VISIT (OUTPATIENT)
Dept: OCCUPATIONAL MEDICINE | Age: 59
End: 2024-09-27
Attending: FAMILY MEDICINE
Payer: COMMERCIAL

## 2024-09-27 DIAGNOSIS — M65.4 TENOSYNOVITIS, DE QUERVAIN: ICD-10-CM

## 2024-09-27 DIAGNOSIS — M77.01 MEDIAL EPICONDYLITIS OF ELBOW, RIGHT: Primary | ICD-10-CM

## 2024-09-27 PROCEDURE — 97165 OT EVAL LOW COMPLEX 30 MIN: CPT

## 2024-09-27 PROCEDURE — 97110 THERAPEUTIC EXERCISES: CPT

## 2024-09-27 NOTE — PROGRESS NOTES
OCCUPATIONAL THERAPY UPPER EXTREMITY EVALUATION     Diagnosis:   Patient Name:   Amanda Khan, ([id number])   Sex: female  : 1965       Order Date:  9/10/2024  Authorizing Provider:   NELL BIRMINGHAM     Procedure:  OP REFERRAL TO [facility] [[id number]]         Order #:   [id number]  Qty:  1     Priority:  Routine                   Class:   IHP - RFL     Standing Interval:          Standing Occurrences:          Expires on:            Expected by:    Associated DX:  Medial epicondylitis of elbow, right (M77.01)  Tenosynovitis, de Quervain (M65.4)     Order summary:  IHP - RFL, Routine, 16 visits  Occupational  Therapy Area of Concentration: Orthopedic  Occupational Therapy Ortho: UE  If the patient can be seen sooner with a PT vs an OT, can we cancel this order and replace with a PT order? Yes         Comments:             Scheduling Instructions:  **REFERRAL REQUEST**      Referring Provider: Nell Birmingham  Date of Evaluation:    2024    Precautions:  None Next MD visit:   none scheduled  Date of Surgery: n/a     PATIENT SUMMARY   Amanda Khan is a 58 year old female who presents to therapy today with complaints of R elbow pain as well as R wrist and thumb pain.  Pt extensively performs desk/computer work which has progressively worsening over the past year.  Pt feels pain from biceps to wrist. Pt does have history of chronic neck pain. Pt feels the UE feels \"heavy\" and weak when trying to lift. Pt does not use pain meds regularly.  Pt describes pain level current 3-4/10, at best 2/10, at worst 8/10. Pain in bicep, radial side of wrist and forearm up to elbow.  Current functional limitations include picking up heavy items, golf, bowling, reaching into purse to lift wallet, wakes pt from sleep.    Amanda describes prior level of function independent with ADL/IADL.   Employment: Working full duty, pt works in logistics on a computer.  Pt has bought a vertical  mouse which has helped.   Hand Dominance: right  Living Situation: w/ spouse  Imaging/Tests: none  Pt goals include decrease pain, increase strength.  Past medical history was reviewed with Amanda. Significant findings include past neck pain has seen chiropractor, rotator cuff pain.         Occupational profile: history and experiences, patterns of daily living, interests, values and needs:    Patient's expressed concerns about performing occupations and daily life on initial eval: home care, self care, leisure     Occupational roles affected by referring diagnosis on initial eval:   Student: N/a     Homemaker: minimally limited   Worker: minimally limited   Leisure: minimally limited   Cook/: limited         ASSESSMENT  Pt presents to occupational therapy evaluation with primary c/o UE dysfunction. The results of the objective tests and measures show  physical, cognitive and/or psychosocial skills affected in the summary below, including specifically pain throughout the UE, decreased UE strength, positive Finkelstein's.  Functional deficits include but are not limited to picking up heavy items, golf, bowling, reaching into purse to lift wallet, wakes pt from sleep.  Signs and symptoms are consistent with diagnosis of DeQuervain's tenosynovitis, possible epicondylitis vs cubital tunnel, possible cervical radiculopathy. Patient and OT discussed evaluation findings, pathology, POC and HEP.  Patient voiced understanding and performs HEP correctly without reported pain. Skilled Occupational Therapy is medically necessary to address the above impairments and reach functional goals.      Occupations as identified above (representing ADL's and IADL's, Education, Work, Leisure, and Social Participation) and the following component areas:     ---Physical skills affected by referring diagnosis: Pain, Decreased range of motion, Fine motor coordination, presumed Decreased strength, edema, Impaired/decreased  sensation in the right hand/rightarm.     ---Cognitive skills affected by referring diagnosis: None     ---Psychosocial skills affected by referring diagnosis: Interpersonal interactions, Habits, Routines, Use of coping strategies.     The Occupational Therapy Evaluation code of 00852 Low Complex was selected based on the followin. Chart Review: A brief chart review indicating low complexity was performed.  Occupational profile: the following were assessed: presenting problems, reasons for referral, occupational history, patterns of daily living, interests, values, needs, client's goals/concerns about performing occupations and daily life skills.  Medical and therapy history review: PLF identified, review of medical records.     2. Assessment of Occupational Performance: (see above summary of performance deficits identified; levels of assessment used) low complexity (1-3 performance deficits relating to physical, cognitive, or psychosocial skills).     3. Clinical decision-making: includes the assessment process, comorbidities (additional diseases/conditions/disorders, socioeconomic, cultural, environmental, client behavior characteristics) such as prior cervical issues: the assessment of modification and need for assistance such as none: selection of interventions such as Manual Therapy, Neuromuscular Re-education, Self-Care Home Management, Therapeutic Activities, Therapeutic Exercise, Home Exercise Program instruction, Modalities to include: Ultrasound and hot packs, FT and taping and custom splinting, plan of care (intervention strategies, specialized skills, outcome goals), indicating a low complexity: analysis of data from problem-focused assessment.     These deficits impair the patient's ability to participate in ADLs, IADLs, and meaningful occupational tasks.  Reduced participation in meaningful occupational tasks may increase feelings of sadness, isolation, and likelihood of falling.     OBJECTIVE     OBSERVATION: Unremarkable     ORTHOTICS: pt uses an ice sleeve on the elbow.     SCAR:  NA      SENSORY: WNL at time of treatment.        CIRCUMFERENTIAL EDEMA (cm):  Right Elbow crease: 25.4  Left Elbow crease: 25.6  Right Wrist crease: 15.5  Left Wrist crease: 15.5  Right MCP: 18.4  Left MCP: 17.8    ROM: (* denotes performed with pain)    UE ROM is grossly WFL with minor limitations in shoulder flexion    AROM/PROM:(Degrees)  Digit ROM is grossly WNL.  Mild intrinsic tightness present  D1 RA R=44, L=55  Opposition intact to 10 on Kapandji scale    MANUAL MUSCLE TESTING: (* denotes performed with pain)     Strength (lbs) Right Average Left Average   : 37, 29, 33 av.7 29, 31, 28 av   3 pt Pinch: 8 10   Lateral Pinch: 13 13     NECK SCREEN: Limitations in cervical extension, limitations in lateral flexion to L    SPECIAL TESTS: Finkelstein's (+) 5/10  (-) Tinels at cubital tunnel  (-) pain with palpation to medial epicondyle  (-) pain resisted wrist flexion    Today’s Treatment and Response:   Pt education was provided on exam findings, treatment diagnosis, treatment plan, expectations, and prognosis. Patient was instructed in and issued a HEP for: ergonomic hand out, hand out on DeQuervain's tenosynovitis, began splint education (thumb), use of heat/ice, STM, gentle thumb ROM, sleep positioning, shoulder rolls.  Pt may benefit from PT evaluation for chronic cervical pain/stiffness.      Charges: OT Eval: Low Complexity, 1 TE      Total Timed Treatment: 45 min     Total Treatment Time: 45 min       PLAN OF CARE   Goals: (to be met in 8 visits)  Pt will be independent and compliant with comprehensive HEP to maintain progress achieved in OT  Pt will be appropriately fit with wrist/thumb spica for use during ADL to rest the thumb  Pt will report pain no greater than 1-2/10 in the thumb while performing self cares  Pt will increase tripod pinch of R to 11 lb for use while opening containers.  Will upgrade  goals PRN.    Frequency / Duration: Patient will be seen for 1-2 x/week or a total of 8 visits over a 90 day period.  Treatment will include: Manual Therapy, Neuromuscular Re-education, Self-Care Home Management, Therapeutic Activities, Therapeutic Exercise, Home Exercise Program instruction, and US, WP, electrical stim, splinting PRN.    Education or treatment limitation: None  Rehab Potential:good    QuickDASH Outcome Score  Score: 31.82 % (9/26/2024  6:17 PM)      Patient/Family/Caregiver was advised of these findings, precautions, and treatment options and has agreed to actively participate in planning and for this course of care.    Thank you for your referral. Please co-sign or sign and return this letter via fax as soon as possible to [phone]. If you have any questions, please contact me at Dept: [phone]    Sincerely,  Electronically signed by therapist: Jacqueline Ann, OT  Physician's certification required: Yes  I certify the need for these services furnished under this plan of treatment and while under my care.    X___________________________________________________ Date____________________    Certification From: 9/27/2024  To:12/26/2024

## 2024-09-28 ENCOUNTER — HOSPITAL ENCOUNTER (OUTPATIENT)
Dept: MAMMOGRAPHY | Age: 59
Discharge: HOME | End: 2024-09-28
Attending: FAMILY MEDICINE
Payer: COMMERCIAL

## 2024-09-28 DIAGNOSIS — Z12.31 ENCOUNTER FOR SCREENING MAMMOGRAM FOR MALIGNANT NEOPLASM OF BREAST: ICD-10-CM

## 2024-09-28 PROCEDURE — 77067 SCR MAMMO BI INCL CAD: CPT | Performed by: FAMILY MEDICINE

## 2024-09-28 PROCEDURE — 77063 BREAST TOMOSYNTHESIS BI: CPT | Performed by: FAMILY MEDICINE

## 2024-10-04 ENCOUNTER — OFFICE VISIT (OUTPATIENT)
Dept: OCCUPATIONAL MEDICINE | Age: 59
End: 2024-10-04
Attending: FAMILY MEDICINE
Payer: COMMERCIAL

## 2024-10-04 PROCEDURE — 97112 NEUROMUSCULAR REEDUCATION: CPT

## 2024-10-04 PROCEDURE — 97110 THERAPEUTIC EXERCISES: CPT

## 2024-10-04 PROCEDURE — 97140 MANUAL THERAPY 1/> REGIONS: CPT

## 2024-10-04 NOTE — PROGRESS NOTES
Diagnosis:   Patient Name:   Amanda Khan, ([id number])   Sex: female  : 1965       Order Date:  9/10/2024  Authorizing Provider:   NELL BIRMINGHAM     Procedure:  OP REFERRAL TO [facility] [[id number]]         Order #:   [id number]  Qty:  1     Priority:  Routine                   Class:   IHP - RFL     Standing Interval:          Standing Occurrences:          Expires on:            Expected by:    Associated DX:  Medial epicondylitis of elbow, right (M77.01)  Tenosynovitis, de Quervain (M65.4)     Order summary:  IHP - RFL, Routine, 16 visits  Occupational  Therapy Area of Concentration: Orthopedic  Occupational Therapy Ortho: UE  If the patient can be seen sooner with a PT vs an OT, can we cancel this order and replace with a PT order? Yes         Comments:             Scheduling Instructions:  **REFERRAL REQUEST**        Referring Provider: Nell Birmingham  Date of Evaluation:     2024     Precautions:  None Next MD visit:   none scheduled  Date of Surgery: n/a   Insurance Primary/Secondary: [employer] [facility] / N/A     # Auth Visits: NA            Subjective: \"I haven't done much today so there's no pain, but in the bicep and the elbow it was up to an 8\"    Pain: 0/10      Objective: Pain just distal to cubital tunnel with palpation  Pain with trigger point throughout thumb extensors  Pain with palpation to brachioradialis      Assessment: Pain persists primarily during typing/computer tasks following long periods of use.  Discussed continued ergonomic assessment of worksite and pt to check with company regarding potential ergonomic assessment. Pain localized to multiple locations though elbow pain worse this visit.  Discussed sleep positioning as well and use of sleeve/block to limit elbow flexion      Goals:  (to be met in 8 visits)  Pt will be independent and compliant with comprehensive HEP to maintain progress achieved in OT  Pt will be appropriately fit with  wrist/thumb spica for use during ADL to rest the thumb  Pt will report pain no greater than 1-2/10 in the thumb while performing self cares  Pt will increase tripod pinch of R to 11 lb for use while opening containers.  Will upgrade goals PRN.    Plan: Continue OT for pain management, progress to strength as able   Date: 10/4/2024  TX#: 2/8 Date:                 TX#: 3/ Date:                 TX#: 4/ Date:                 TX#: 5/ Date:   Tx#: 6/   IASTM through thumb extensors, brachioradialis    STM to medial epicondyle, neural mobilization through ulnar nerve pathway    Skin mob throughout upper arm       Neural glides  -ulnar  -brachial plexus       Tensogrip E provided to use with block for sleep       Elbow flex/ext  Forearm pronation/supination  Combined motion    Wrist flexion/extension    Table Vs for stretch       HEP: sleep positioning    Charges: 1 MT, 1 TE, 1 neuro       Total Timed Treatment: 45 min  Total Treatment Time: 45 min

## 2024-10-08 ENCOUNTER — OFFICE VISIT (OUTPATIENT)
Dept: OCCUPATIONAL MEDICINE | Age: 59
End: 2024-10-08
Attending: FAMILY MEDICINE
Payer: COMMERCIAL

## 2024-10-08 PROCEDURE — 97110 THERAPEUTIC EXERCISES: CPT

## 2024-10-08 PROCEDURE — 97140 MANUAL THERAPY 1/> REGIONS: CPT

## 2024-10-08 PROCEDURE — 97014 ELECTRIC STIMULATION THERAPY: CPT

## 2024-10-08 NOTE — PROGRESS NOTES
Diagnosis:   Patient Name:   Amanda Khan, ([id number])   Sex: female  : 1965       Order Date:  9/10/2024  Authorizing Provider:   NELL BIRMINGHAM     Procedure:  OP REFERRAL TO [facility] [[id number]]         Order #:   [id number]  Qty:  1     Priority:  Routine                   Class:   IHP - RFL     Standing Interval:          Standing Occurrences:          Expires on:            Expected by:    Associated DX:  Medial epicondylitis of elbow, right (M77.01)  Tenosynovitis, de Quervain (M65.4)     Order summary:  IHP - RFL, Routine, 16 visits  Occupational  Therapy Area of Concentration: Orthopedic  Occupational Therapy Ortho: UE  If the patient can be seen sooner with a PT vs an OT, can we cancel this order and replace with a PT order? Yes         Comments:             Scheduling Instructions:  **REFERRAL REQUEST**        Referring Provider: Nell Birmingham  Date of Evaluation:     2024     Precautions:  None Next MD visit:   none scheduled  Date of Surgery: n/a   Insurance Primary/Secondary: [employer] [facility] / N/A     # Auth Visits: NA            Subjective: \"The thumb is really not an issue right now.  But there's soreness on that muscle on the top of the arm\"  \"I only tried to sleep with the sleeve once and it was too difficult.\"    Pain: 0/10      Objective:   Pain with palpation to brachioradialis      Assessment: Pain persists primarily during typing/computer tasks following long periods of use.  Pt noting pain primarily over wrist extensors and brachioradialis, no pain with palpation to medial epicondyle and also no pain at 1DC.      Goals:  (to be met in 8 visits)  Pt will be independent and compliant with comprehensive HEP to maintain progress achieved in OT  Pt will be appropriately fit with wrist/thumb spica for use during ADL to rest the thumb  Pt will report pain no greater than 1-2/10 in the thumb while performing self cares  Pt will increase tripod pinch  of R to 11 lb for use while opening containers.  Will upgrade goals PRN.    Plan: Continue OT for pain management, progress to strength as able   Date: 10/4/2024  TX#: 2/8 Date: 10/8/24                TX#: 3/8 Date:                 TX#: 4/ Date:                 TX#: 5/ Date:   Tx#: 6/   IASTM through thumb extensors, brachioradialis    STM to medial epicondyle, neural mobilization through ulnar nerve pathway    Skin mob throughout upper arm IASTM throughout wrist and thumb extensors and into brachioradialis      Neural glides  -ulnar  -brachial plexus beige Flex bar  -wrist flexion  -wrist extension  -bar bend  -reverse bar bend  -supination  -pronation  Each x 20       Tensogrip E provided to use with block for sleep Gripping tasks x 20      Elbow flex/ext  Forearm pronation/supination  Combined motion    Wrist flexion/extension    Table Vs for stretch IFC  Quad, sweep to pt tolerance 10 min      HEP: sleep positioning    Charges: 1 MT, 1 TE, 1electrical stim     Total Timed Treatment: 45 min  Total Treatment Time: 45 min

## 2024-10-15 ENCOUNTER — APPOINTMENT (OUTPATIENT)
Dept: OCCUPATIONAL MEDICINE | Age: 59
End: 2024-10-15
Attending: FAMILY MEDICINE
Payer: COMMERCIAL

## 2024-10-17 ENCOUNTER — APPOINTMENT (OUTPATIENT)
Dept: OCCUPATIONAL MEDICINE | Age: 59
End: 2024-10-17
Attending: FAMILY MEDICINE
Payer: COMMERCIAL

## 2024-10-17 ENCOUNTER — TELEPHONE (OUTPATIENT)
Dept: FAMILY MEDICINE CLINIC | Facility: CLINIC | Age: 59
End: 2024-10-17

## 2024-10-17 DIAGNOSIS — M65.4 TENOSYNOVITIS, DE QUERVAIN: ICD-10-CM

## 2024-10-17 DIAGNOSIS — M77.01 MEDIAL EPICONDYLITIS OF ELBOW, RIGHT: Primary | ICD-10-CM

## 2024-10-21 ENCOUNTER — APPOINTMENT (OUTPATIENT)
Dept: OCCUPATIONAL MEDICINE | Age: 59
End: 2024-10-21
Attending: FAMILY MEDICINE
Payer: COMMERCIAL

## 2024-10-25 ENCOUNTER — APPOINTMENT (OUTPATIENT)
Dept: OCCUPATIONAL MEDICINE | Age: 59
End: 2024-10-25
Attending: FAMILY MEDICINE
Payer: COMMERCIAL

## 2024-10-29 ENCOUNTER — APPOINTMENT (OUTPATIENT)
Dept: OCCUPATIONAL MEDICINE | Age: 59
End: 2024-10-29
Attending: FAMILY MEDICINE
Payer: COMMERCIAL

## 2025-07-01 ENCOUNTER — NURSE TRIAGE (OUTPATIENT)
Dept: FAMILY MEDICINE CLINIC | Facility: CLINIC | Age: 60
End: 2025-07-01

## 2025-07-01 RX ORDER — ESCITALOPRAM OXALATE 5 MG/1
5 TABLET ORAL DAILY
Qty: 30 TABLET | Refills: 1 | Status: SHIPPED | OUTPATIENT
Start: 2025-07-01

## 2025-07-01 NOTE — TELEPHONE ENCOUNTER
I see Lexapro in her chart?  Im will to send an RX with close interval follow-up in the next couple weeks  She is overdue for annual.  (Appts available on 7/19)   Please clarify what RX.

## 2025-07-01 NOTE — TELEPHONE ENCOUNTER
Dr. Birmingham- appointment scheduled for 7/19. Patient looking for lexapro, not prozac.     Called and spoke to pt. Confirmed pt looking for lexapro, she stated incorrect medication in earlier call. Patient agreeable to physical on 7/19, appointment scheduled.

## 2025-07-01 NOTE — TELEPHONE ENCOUNTER
Action Requested: Summary for Provider     []  Critical Lab, Recommendations Needed  [x] Need Additional Advice  []   FYI    []   Need Orders  [] Need Medications Sent to Pharmacy  []  Other     SUMMARY: Pt inquiring if PCP could prescribe Prozac?    Reason for call: anxiety  Onset: 2 months    Per pt, dealing with several events that are causing anxiety and slight depression. Confirmed no SI/HI. Pt requesting to start Prozac. Last taken after pandemic.   Informed no availability today or rest of the week. Pt requesting we send message to PCP if wiling to accommodate today or prescribe med. Preferably virtual visit.  Reviewed care advice. Informed will relay to PCP rx request.    Reason for Disposition   MODERATE anxiety (e.g., persistent or frequent anxiety symptoms; interferes with sleep, school, or work)    Protocols used: Anxiety and Panic Attack-A-[place]

## 2025-07-02 DIAGNOSIS — F41.8 DEPRESSION WITH ANXIETY: ICD-10-CM

## 2025-07-06 RX ORDER — ESCITALOPRAM OXALATE 5 MG/1
5 TABLET ORAL DAILY
Qty: 30 TABLET | Refills: 1 | Status: CANCELLED | OUTPATIENT
Start: 2025-07-06

## 2025-07-07 RX ORDER — ESCITALOPRAM OXALATE 10 MG/1
10 TABLET ORAL DAILY
Qty: 90 TABLET | Refills: 0 | OUTPATIENT
Start: 2025-07-07

## 2025-07-08 NOTE — TELEPHONE ENCOUNTER
Patient called stating that pharmacy still hasn't received Lexapro rx. Informed that it was sent on 7/1/25. Patient requesting we call pharmacy to verify. Spoke to Sylvia at [facility], states they don't have prescription. Verbal prescription given.      Disp Refills Start End    escitalopram 5 MG Oral Tab 30 tablet 1 7/1/2025 --    Sig - Route: Take 1 tablet (5 mg total) by mouth daily. - Oral    Sent to pharmacy as: Escitalopram Oxalate 5 MG Oral Tablet (Lexapro)    E-Prescribing Status: Receipt confirmed by pharmacy (7/1/2025  6:03 PM CDT)

## 2025-07-19 ENCOUNTER — OFFICE VISIT (OUTPATIENT)
Dept: FAMILY MEDICINE CLINIC | Facility: CLINIC | Age: 60
End: 2025-07-19
Payer: COMMERCIAL

## 2025-07-19 VITALS
SYSTOLIC BLOOD PRESSURE: 110 MMHG | DIASTOLIC BLOOD PRESSURE: 68 MMHG | RESPIRATION RATE: 16 BRPM | WEIGHT: 208 LBS | OXYGEN SATURATION: 97 % | HEIGHT: 68 IN | TEMPERATURE: 97 F | BODY MASS INDEX: 31.52 KG/M2 | HEART RATE: 82 BPM

## 2025-07-19 DIAGNOSIS — Z12.31 ENCOUNTER FOR SCREENING MAMMOGRAM FOR MALIGNANT NEOPLASM OF BREAST: ICD-10-CM

## 2025-07-19 DIAGNOSIS — R53.83 FATIGUE, UNSPECIFIED TYPE: ICD-10-CM

## 2025-07-19 DIAGNOSIS — Z00.01 ENCOUNTER FOR GENERAL ADULT MEDICAL EXAMINATION WITH ABNORMAL FINDINGS: Primary | ICD-10-CM

## 2025-07-19 DIAGNOSIS — L82.0 SEBORRHEIC KERATOSIS, INFLAMED: ICD-10-CM

## 2025-07-19 DIAGNOSIS — Z78.0 POST-MENOPAUSE: ICD-10-CM

## 2025-07-19 DIAGNOSIS — R45.89 DEPRESSED MOOD: ICD-10-CM

## 2025-07-19 NOTE — PROGRESS NOTES
The following individual(s) verbally consented to be recorded using ambient AI listening technology and understand that they can each withdraw their consent to this listening technology at any point by asking the clinician to turn off or pause the recording:    Patient name: Amanda Khan   Additional names:

## 2025-07-19 NOTE — ASSESSMENT & PLAN NOTE
Depressive symptoms with indifference and withdrawal, exacerbated by life changes. Currently on Lexapro with no significant side effects. Discussed potential Wellbutrin augmentation for persistent symptoms.  - Continue Lexapro, monitor for effect over six weeks.  - Consider Wellbutrin if symptoms persist after six weeks.  - Order blood work to assess for anemia and other fatigue contributors.

## 2025-07-19 NOTE — PROGRESS NOTES
Family Medicine Progress Note  Assessment & Plan:   Follow-Up: Return for 6-8wks for F/U Mental Health can be via [employer] .        Assessment & Plan  Encounter for general adult medical examination with abnormal findings  Wellness Exam done today and routine Preventative Care discussed as noted below.   -Pap smear:  01/05/2023 up-to-date   -Mammogram:  09/28/2024- due in Sept   -CRC Screen: up-to-date   -PHQ2- Negative   -Encouraged healthy eating habits and Routine Exercise.   -Bone health: Ca-Vit D supplementation Recc  -Screening labs ordered and will be released via [employer].   -Recommend Annual Well Woman Exams.    Orders:    Lipid Panel    TSH W Reflex To Free T4    CBC With Differential With Platelet    Comp Metabolic Panel (14)    Fatigue, unspecified type  Check for nutritional def, could be due to overall mental health/menopause.   Orders:    Vitamin B12    Iron And Tibc    Vitamin D, 25-Hydroxy    Ferritin    Seborrheic keratosis, inflamed  Seborrheic keratosis causing irritation due to picking. Patient interested in removal.  - Perform cryotherapy to remove seborrheic keratosis.  - R/BA/ discussed, consent signed and scanned.  See Proc note.    Encounter for screening mammogram for malignant neoplasm of breast    Orders:    [facility] GABBY 2D+3D SCREENING BILAT (CPT=77067/08866); Future; Expected date: 07/19/2025    Depressed mood  Depressive symptoms with indifference and withdrawal, exacerbated by life changes. Currently on Lexapro with no significant side effects. Discussed potential Wellbutrin augmentation for persistent symptoms.  - Continue Lexapro, monitor for effect over six weeks.  - Consider Wellbutrin if symptoms persist after six weeks.  - Order blood work to assess for anemia and other fatigue contributors.  Post-menopause  Experiencing menopausal symptoms with demotivation. Prefers to avoid HRT due to concerns about breast cancer.          Subjective:    History of Present Illness:  History  obtained from patient.   CC: Physical and Moles (On Lt thigh for a year . Growing .)    Amanda Khan is a 59 year old female who presents for Physical and Moles (On Lt thigh for a year . Growing .)   History of Present Illness    ANNUAL PHYSICAL  Patient is post-menopausal--Denies vaginal bleeding or concerning vaginal symptoms  Cervical Cancer Screening-Last pap-2023 - due in    PMH of Abnormal Pap: denies   Breast Cancer Screenin2024 - due in Sept   Colon Cancer Screening: Last completed- 2017 - 10y recall    Exercise: generally tries to be active  Healthy eating habits:  Generally Healthy LAck of weight loss despite previous efforts with a prepared food plan, which she discontinued due to boredom and cost.  Tobacco/Alcohol: denies    Immunizations: PCV eligible    MOOD- Sent message in  to inquire about restarting Lexapro, had been on this during COVID.   Recently- experiencing indifference and demotivation, leading to withdrawal from social activities and a lack of interest in various aspects of life.   tolerance for frustration has decreased, impacting her work and home life.  contemplating half-way and possibly moving out of [place], which adds to her stress.  Only has been on Lexapro x 1 wk., No AE.     She is postmenopausal and attributes many of her symptoms to menopause, including a general 'menopausal funk.' No postmenopausal bleeding. She has a history of a friend who developed breast cancer after hormone replacement therapy, influencing her views on such treatments. She is curious about her blood work results.    MAGUI/MDD- Lexapro 5mg   Pmhx: Migraines, Anemia, Crosby Palsy, Anxiety  Pshx: CSPY, Facial N Surg, Lasik  All: NKDA  Meds: as below  Fam hx: Dad: Parkinson's, COPD, asthma, glaucoma, mom: PSP (progressive supranuclear palsy), both sides with depression  Soc hx: no tob/occ etoh/no illicits; , lives with spouse, works I  logistics  Ob/gyne: No LMP  recorded. (Menstrual status: Menopause).. last pap:  2023 last mammogram: 2024  , Children- 0  Colonoscopy: 2017      MAGUI-7 Scale       Feeling nervous, anxious, or on edge: Several days  Not being able to stop or control worrying: Not at all  Worrying too much about different things   : Several days  Trouble relaxing: Not at all  Being so restless that it's hard to sit still: Not at all  Becoming easily annoyed or irritable: Over half days  Feeling afraid as if something awful might happen: Not at all    MAGUI 7 Total Score: 4 - moderate anxiety  If you checked off any problems, how difficult have these made it for you to do your work, take care of things at home, or get along with other people?: Not difficult at all         PHQ9 Scale     1. Little interest or pleasure in doing things: More than half the days  2. Feeling down, depressed, or hopeless: Several days  3. Trouble falling or staying asleep, or sleeping too much: Several days  4. Feeling tired or having little energy: Not at all  5. Poor appetite or overeating: Not at all  6. Feeling bad about yourself - or that you are a failure or have let yourself or your family down: Not at all  7. Trouble concentrating on things, such as reading the newspaper or watching television: Not at all  8. Moving or speaking so slowly that other people could have noticed. Or the opposite - being so fidgety or restless that you have been moving around a lot more than usual: Not at all  9. Thoughts that you would be better off dead, or of hurting yourself in some way: Not at all  PHQ-9 TOTAL SCORE: 4  If you checked off any problems, how difficult have these problems made it for you to do your work, take care of things at home, or get along with other people?: Not difficult at all            History/Other:   ROS-Per HPI     Problem List:  Patient Active Problem List   Diagnosis    Migraine    Chronic neck pain    Vitamin D deficiency    Depressed mood        Current Medications:  Current Medications[1]     Past Medical History:  Past Medical History[2]     Past Surgical History:  Past Surgical History[3]     Family History:  Family History[4]     Social History:  Short Social Hx on File[5]      Allergies:  No Known Allergies     Objective:    VITALS: /68   Pulse 82   Temp 96.5 °F (35.8 °C) (Temporal)   Resp 16   Ht 5' 8\" (1.727 m)   Wt 208 lb (94.3 kg)   SpO2 97%   BMI 31.63 kg/m²      BP Readings from Last 3 Encounters:   07/19/25 110/68   09/10/24 102/70   06/13/24 100/70     Wt Readings from Last 3 Encounters:   07/19/25 208 lb (94.3 kg)   09/10/24 207 lb (93.9 kg)   06/13/24 207 lb (93.9 kg)     PHYSICAL EXAM  GEN: pleasant, well-appearing in NAD, AOX3  SKIN: tan stuck on papule to left lateral thigh.  Surrounding erythema about 7mm.    HEENT: PERRL, EOMI, moist mucous membranes, oropharynx clear, TM clear, nares patent, no Thyromegaly or nodule.   CV: RRR, no murmurs or abnl heart sounds   PULM: Clear to auscultation, No wheezes, rales, rhonchi.  Non-labored breathing.  ABD: Soft, non-tender, non-distended, + BS, no rigidity/guarding  EXT:  Warm, well perfused, no lower extremity edema  NEURO: CNs grossly intact, no focal weakness  MSK: moves all 4 extremities without difficulty  MENTAL STATUS EXAM  Appearance: groomed appropriately, makes good eye contact  Attitude: cooperative  Motor: No psychomotor agitation/depression   Speech: normal rate and rhythm   Mood: Indifferent   Affect:  mood congruent  Form of Thought:  Fluid  Thought Content: No SI/HI   Perception: No hallucinations or delusions  Judgement and Insight- Intact      Family Medicine Procedure Note  Pre-Op Diagnosis: Irritated SK -Left lateral Thigh   Post-Op Diagnosis: Irritated SK -Left lateral Thigh   Procedure: Cryotherapy  Physician: Nkechi Birmingham Do   Findings: Irritated SK -Left lateral Thigh   Anesthesia: None  Description: Before the procedure, we discussed the nature of  the procedure along with the risks (pain, blistering, infection, incomplete destruction of lesion, scarring, hypopigmentation, hair loss, tissue not available for histopathologic diagnosis) benefits and alternatives. The patient carefully considered all of this information and chose to proceed with the procedure. All the patient's questions were answered. The consent form was signed and will be scanned into the medical record.   A safety pause was performed to verify correct patient, procedure, equipment, support staff and surgical site.   Liquid nitrogen applied to the lesion(s) using spray technique until formation of an ice ball. Area allowed to thaw. Freeze-thaw technique was repeated. A total of 1 lesions were treated. Patient tolerated the procedure well.   Estimated Blood Loss: No Blood Loss  Specimens: None  Complications: None  Condition: Stable   Disposition: Home   Instructions:  - Keep the site clean and protected from irritation and injury.   - Avoid exposure to sun because the new skin forming is sensitive to injury.   - Blister formation is normal. Leave it in place. If it ruptures, gently wash the area 3 to 4 times a day with mild soap and water.  - There are no limits on bathing, swimming, or showering  - Aspirin, acetaminophen, and ibuprofen can be used for pain.    Follow-up:  If lesions recurs or does not heal; may need Biopsy        Nkechi Birmingham DO    07/19/25 11:14 AM           [1]   Current Outpatient Medications   Medication Sig Dispense Refill    escitalopram 5 MG Oral Tab Take 1 tablet (5 mg total) by mouth daily. 30 tablet 1    ferrous sulfate 325 (65 FE) MG Oral Tab EC Take 1 tablet (325 mg total) by mouth daily with breakfast. (Patient taking differently: Take 28 mg by mouth daily with breakfast. Taking 28 mg once every 3 days .)      Cholecalciferol 125 MCG (5000 UT) Oral Tab Take 2 tablets (10,000 Units total) by mouth daily. 2-2000 (Patient taking differently: Take 2 tablets  (10,000 Units total) by mouth in the morning. Taking 2- 3 2000 iu.)     [2]   Past Medical History:   Allergic rhinitis    Anxiety    Depression    Hospitalism    none   [3]   Past Surgical History:  Procedure Laterality Date    Colonoscopy N/A 3/13/2017    Procedure: COLONOSCOPY, POSSIBLE BIOPSY, POSSIBLE POLYPECTOMY 04059;  Surgeon: Deangelo Mcclendon MD;  Location[facility]    Other      lasik eye surgery    Other surgical history      bells palsy, ear surgery   [4]   Family History  Problem Relation Age of Onset    Hypertension Mother     Other (Other) Mother         PSP (similiar to ALS)    Depression Mother     Asthma Father     Heart Disorder Father         Pacer    Stroke Maternal Grandmother     Stroke Paternal Grandmother     Breast Cancer Cousin         maternal cousin dx age ?????   [5]   Social History  Socioeconomic History    Marital status:     Number of children: 0   Occupational History    Occupation:    Tobacco Use    Smoking status: Never    Smokeless tobacco: Never   Vaping Use    Vaping status: Never Used   Substance and Sexual Activity    Alcohol use: Yes     Alcohol/week: 3.0 standard drinks of alcohol     Types: 3 Standard drinks or equivalent per week     Comment: social     Drug use: No    Sexual activity: Yes     Partners: Male   Other Topics Concern    Caffeine Concern No    Exercise No    Seat Belt Yes    Special Diet No    Stress Concern No    Weight Concern Yes     Comment: Menopause weight gain     Social Drivers of Health     Food Insecurity: No Food Insecurity (7/19/2025)    NCSS - Food Insecurity     Worried About Running Out of Food in the Last Year: No     Ran Out of Food in the Last Year: No   Transportation Needs: No Transportation Needs (7/19/2025)    NCSS - Transportation     Lack of Transportation: No   Housing Stability: Not At Risk (7/19/2025)    NCSS - Housing/Utilities     Has Housing: Yes     Worried About Losing Housing: No     Unable to  Get Utilities: No

## 2025-07-24 LAB
% SATURATION: 9 % (CALC) (ref 16–45)
ABSOLUTE BASOPHILS: 83 CELLS/UL (ref 0–200)
ABSOLUTE EOSINOPHILS: 173 CELLS/UL (ref 15–500)
ABSOLUTE LYMPHOCYTES: 1290 CELLS/UL (ref 850–3900)
ABSOLUTE MONOCYTES: 510 CELLS/UL (ref 200–950)
ABSOLUTE NEUTROPHILS: 5445 CELLS/UL (ref 1500–7800)
ALBUMIN/GLOBULIN RATIO: 1.3 (CALC) (ref 1–2.5)
ALBUMIN: 4.1 G/DL (ref 3.6–5.1)
ALKALINE PHOSPHATASE: 118 U/L (ref 37–153)
ALT: 13 U/L (ref 6–29)
AST: 15 U/L (ref 10–35)
BASOPHILS: 1.1 %
BILIRUBIN, TOTAL: 0.3 MG/DL (ref 0.2–1.2)
BUN: 12 MG/DL (ref 7–25)
CALCIUM: 9.1 MG/DL (ref 8.6–10.4)
CARBON DIOXIDE: 25 MMOL/L (ref 20–32)
CHLORIDE: 106 MMOL/L (ref 98–110)
CHOL/HDLC RATIO: 2.7 (CALC)
CHOLESTEROL, TOTAL: 191 MG/DL
CREATININE: 0.94 MG/DL (ref 0.5–1.05)
EGFR: 69 ML/MIN/1.73M2
EOSINOPHILS: 2.3 %
FERRITIN: 9 NG/ML (ref 16–232)
GLOBULIN: 3.2 G/DL (CALC) (ref 1.9–3.7)
GLUCOSE: 95 MG/DL (ref 65–99)
HDL CHOLESTEROL: 71 MG/DL
HEMATOCRIT: 41 % (ref 35–45)
HEMOGLOBIN: 12.7 G/DL (ref 11.7–15.5)
IRON BINDING CAPACITY: 359 MCG/DL (CALC) (ref 250–450)
IRON, TOTAL: 31 MCG/DL (ref 45–160)
LDL-CHOLESTEROL: 101 MG/DL (CALC)
LYMPHOCYTES: 17.2 %
MCH: 27 PG (ref 27–33)
MCHC: 31 G/DL (ref 32–36)
MCV: 87.2 FL (ref 80–100)
MONOCYTES: 6.8 %
MPV: 10.5 FL (ref 7.5–12.5)
NEUTROPHILS: 72.6 %
NON-HDL CHOLESTEROL: 120 MG/DL (CALC)
PLATELET COUNT: 319 THOUSAND/UL (ref 140–400)
POTASSIUM: 4.4 MMOL/L (ref 3.5–5.3)
PROTEIN, TOTAL: 7.3 G/DL (ref 6.1–8.1)
RDW: 13.1 % (ref 11–15)
RED BLOOD CELL COUNT: 4.7 MILLION/UL (ref 3.8–5.1)
SODIUM: 140 MMOL/L (ref 135–146)
TRIGLYCERIDES: 93 MG/DL
TSH W/REFLEX TO FT4: 1.59 MIU/L (ref 0.4–4.5)
VITAMIN B12: 479 PG/ML (ref 200–1100)
VITAMIN D, 25-OH, TOTAL: 41 NG/ML (ref 30–100)
WHITE BLOOD CELL COUNT: 7.5 THOUSAND/UL (ref 3.8–10.8)

## 2025-08-18 ENCOUNTER — PATIENT MESSAGE (OUTPATIENT)
Dept: FAMILY MEDICINE CLINIC | Facility: CLINIC | Age: 60
End: 2025-08-18

## 2025-08-19 RX ORDER — ESCITALOPRAM OXALATE 5 MG/1
5 TABLET ORAL DAILY
Qty: 90 TABLET | Refills: 3 | Status: SHIPPED | OUTPATIENT
Start: 2025-08-19